# Patient Record
Sex: MALE | Race: WHITE | NOT HISPANIC OR LATINO | Employment: FULL TIME | URBAN - METROPOLITAN AREA
[De-identification: names, ages, dates, MRNs, and addresses within clinical notes are randomized per-mention and may not be internally consistent; named-entity substitution may affect disease eponyms.]

---

## 2018-07-24 ENCOUNTER — OFFICE VISIT (OUTPATIENT)
Dept: INTERNAL MEDICINE | Facility: CLINIC | Age: 53
End: 2018-07-24

## 2018-07-24 VITALS
SYSTOLIC BLOOD PRESSURE: 128 MMHG | WEIGHT: 228 LBS | BODY MASS INDEX: 28.35 KG/M2 | TEMPERATURE: 98.3 F | RESPIRATION RATE: 16 BRPM | HEIGHT: 75 IN | OXYGEN SATURATION: 98 % | DIASTOLIC BLOOD PRESSURE: 78 MMHG | HEART RATE: 73 BPM

## 2018-07-24 DIAGNOSIS — L03.314 CELLULITIS OF RIGHT GROIN: ICD-10-CM

## 2018-07-24 DIAGNOSIS — D53.9 MACROCYTIC ANEMIA: ICD-10-CM

## 2018-07-24 DIAGNOSIS — Z12.5 SCREENING FOR PROSTATE CANCER: ICD-10-CM

## 2018-07-24 DIAGNOSIS — Z12.11 SCREEN FOR COLON CANCER: ICD-10-CM

## 2018-07-24 DIAGNOSIS — S76.211A GROIN STRAIN, RIGHT, INITIAL ENCOUNTER: Primary | ICD-10-CM

## 2018-07-24 PROBLEM — S76.219A GROIN STRAIN: Status: ACTIVE | Noted: 2018-07-24

## 2018-07-24 LAB
ALBUMIN SERPL-MCNC: 4.6 G/DL (ref 3.5–5.2)
ALBUMIN/GLOB SERPL: 2.1 G/DL
ALP SERPL-CCNC: 78 U/L (ref 40–129)
ALT SERPL-CCNC: 22 U/L (ref 5–41)
AST SERPL-CCNC: 30 U/L (ref 5–40)
BASOPHILS # BLD AUTO: 0.02 10*3/MM3 (ref 0–0.2)
BASOPHILS NFR BLD AUTO: 0.2 % (ref 0–2)
BILIRUB SERPL-MCNC: 0.6 MG/DL (ref 0.2–1.2)
BUN SERPL-MCNC: 18 MG/DL (ref 6–20)
BUN/CREAT SERPL: 18.8 (ref 7–25)
CALCIUM SERPL-MCNC: 9.7 MG/DL (ref 8.6–10.5)
CHLORIDE SERPL-SCNC: 101 MMOL/L (ref 98–107)
CO2 SERPL-SCNC: 28 MMOL/L (ref 22–29)
CREAT SERPL-MCNC: 0.96 MG/DL (ref 0.76–1.27)
EOSINOPHIL # BLD AUTO: 0.1 10*3/MM3 (ref 0.1–0.3)
EOSINOPHIL NFR BLD AUTO: 1.2 % (ref 0–4)
ERYTHROCYTE [DISTWIDTH] IN BLOOD BY AUTOMATED COUNT: 12.8 % (ref 11.5–14.5)
GLOBULIN SER CALC-MCNC: 2.2 GM/DL
GLUCOSE SERPL-MCNC: 95 MG/DL (ref 65–99)
HCT VFR BLD AUTO: 40.3 % (ref 42–52)
HGB BLD-MCNC: 13.8 G/DL (ref 14–18)
IMM GRANULOCYTES # BLD: 0.01 10*3/MM3 (ref 0–0.03)
IMM GRANULOCYTES NFR BLD: 0.1 % (ref 0–0.5)
LYMPHOCYTES # BLD AUTO: 1.15 10*3/MM3 (ref 0.6–4.8)
LYMPHOCYTES NFR BLD AUTO: 14.3 % (ref 20–45)
MCH RBC QN AUTO: 32.6 PG (ref 27–31)
MCHC RBC AUTO-ENTMCNC: 34.2 G/DL (ref 31–37)
MCV RBC AUTO: 95.3 FL (ref 80–94)
MONOCYTES # BLD AUTO: 0.77 10*3/MM3 (ref 0–1)
MONOCYTES NFR BLD AUTO: 9.6 % (ref 3–8)
NEUTROPHILS # BLD AUTO: 5.98 10*3/MM3 (ref 1.5–8.3)
NEUTROPHILS NFR BLD AUTO: 74.6 % (ref 45–70)
NRBC BLD AUTO-RTO: 0 /100 WBC (ref 0–0)
PLATELET # BLD AUTO: 337 10*3/MM3 (ref 140–500)
POTASSIUM SERPL-SCNC: 4.6 MMOL/L (ref 3.5–5.2)
PROT SERPL-MCNC: 6.8 G/DL (ref 6–8.5)
PSA SERPL-MCNC: 0.21 NG/ML (ref 0–4)
RBC # BLD AUTO: 4.23 10*6/MM3 (ref 4.7–6.1)
SODIUM SERPL-SCNC: 141 MMOL/L (ref 136–145)
VIT B12 SERPL-MCNC: 387 PG/ML
WBC # BLD AUTO: 8.03 10*3/MM3 (ref 4.8–10.8)

## 2018-07-24 PROCEDURE — 99203 OFFICE O/P NEW LOW 30 MIN: CPT | Performed by: NURSE PRACTITIONER

## 2018-07-24 RX ORDER — SULFAMETHOXAZOLE AND TRIMETHOPRIM 800; 160 MG/1; MG/1
1 TABLET ORAL 2 TIMES DAILY
Qty: 14 TABLET | Refills: 0 | Status: SHIPPED | OUTPATIENT
Start: 2018-07-24 | End: 2018-10-12

## 2018-07-24 NOTE — PATIENT INSTRUCTIONS
Adductor Muscle Strain  An adductor muscle strain, also called a groin strain or pull, is an injury to the muscles or tendon on the upper inner part of the thigh. These muscles are called the adductor muscles or groin muscles. They are responsible for moving the leg across the body or pulling the legs together. A muscle strain occurs when a muscle is overstretched and some muscle fibers are torn. An adductor muscle strain can range from mild to severe, depending on how many muscle fibers are affected and whether the muscle fibers are partially or completely torn.  Adductor muscle strains usually occur during exercise or participation in sports. The injury often happens when a sudden, violent force is placed on a muscle, stretching the muscle too far. A strain is more likely to occur when your muscles are not warmed up or if you are not properly conditioned. Depending on the severity of the muscle strain, recovery time may vary from a few weeks to several weeks. Severe injuries often require 4-6 weeks for recovery. In those cases, complete healing can take 4-5 months.  What are the causes?  This injury may be caused by:  · Stretching the adductor muscles too far or too suddenly, often during side-to-side motion with an abrupt change in direction.  · Putting repeated stress on the adductor muscles over a long period of time.  · Performing vigorous activity without properly stretching the adductor muscles beforehand.    What are the signs or symptoms?  Symptoms of this condition include:  · Pain and tenderness in the groin area. This begins as sharp pain and persists as a dull ache.  · A popping or snapping feeling when the injury occurs (for severe strains).  · Swelling or bruising.  · Muscle spasms.  · Weakness in the leg.  · Stiffness in the groin area with decreased ability to move the affected muscles.    How is this diagnosed?  This condition may be diagnosed based on your symptoms, your description of how the  injury occurred, and a physical exam. X-rays are sometimes needed to rule out a broken bone or cartilage problems. A CT scan or MRI may be done if your health care provider suspects a complete muscle tear or needs to check for other injuries.  How is this treated?  An adductor strain will often heal on its own. Typically, treatment will involve protecting the injured area, rest, ice, pressure (compression), and elevation. This is often called PRICE therapy. Your health care provider may also prescribe medicines to help manage pain and swelling (anti-inflammatory medicine). You may be told to use crutches for the first few days to minimize your pain.  Follow these instructions at home:  PRICE Therapy  · Protect the muscle from being injured again.  · Rest. Do not use the strained muscle if it causes pain.  · If directed, apply ice to the injured area:  ? Put ice in a plastic bag.  ? Place a towel between your skin and the bag.  ? Leave the ice on for 20 minutes, 2-3 times a day. Do this for the first 2 days after the injury.  · Apply compression by wrapping the injured area with an elastic bandage as told by your health care provider.  · Raise (elevate) the injured area above the level of your heart while you are sitting or lying down.  General instructions  · Take over-the-counter and prescription medicines only as told by your health care provider.  · Walk, stretch, and do exercises as told by your health care provider. Only do these activities if you can do so without any pain.  How is this prevented?  · Warm up and stretch before being active.  · Cool down and stretch after being active.  · Give your body time to rest between periods of activity.  · Make sure to use equipment that fits you.  · Be safe and responsible while being active to avoid slips and falls.  · Maintain physical fitness, including:  ? Proper conditioning in the adductor muscles.  ? Overall strength, flexibility, and endurance.  Contact a  health care provider if:  · You have increased pain or swelling in the affected area.  · Your symptoms are not improving or they are getting worse.  This information is not intended to replace advice given to you by your health care provider. Make sure you discuss any questions you have with your health care provider.  Document Released: 08/15/2005 Document Revised: 07/07/2017 Document Reviewed: 09/20/2016  Elsevier Interactive Patient Education © 2018 [employer]

## 2018-07-24 NOTE — PROGRESS NOTES
"Chief Complaint   Patient presents with   • Groin Pain     Rt side; X2Wk       Subjective     Rell Wilson is a 52 y.o. male being seen for a follow up appointment today regarding right groin pain. He was lunging at [place], and felt pain. Associated swelling, redness,  and 'knotty\" area on right thigh. Pain  5-6 of 10. Pain increasing with walking, golfing. He did not try anything for it or apply ice.    He has history of Macrocytic anemia, and has not had any labs done recently    He will also need colon cancer screening done and prostate screening done.      History of Present Illness     Allergies   Allergen Reactions   • Cefadroxil    • Cephalexin    • Dust Mite Extract    • Other    • Penicillins    • Pollen Extract    • Trichophyton          Current Outpatient Prescriptions:   •  albuterol (PROVENTIL HFA;VENTOLIN HFA) 108 (90 BASE) MCG/ACT inhaler, Proventil  (90 Base) MCG/ACT Inhalation Aerosol Solution; Patient Sig: Proventil  (90 Base) MCG/ACT Inhalation Aerosol Solution INHALE 1 TO 2 PUFFS EVERY 4 TO 6 HOURS AS NEEDED.; 1; 3; 16-Oct-2012; Active, Disp: , Rfl:   •  omeprazole OTC (PriLOSEC OTC) 20 MG EC tablet, Take  by mouth., Disp: , Rfl:     The following portions of the patient's history were reviewed and updated as appropriate: allergies, current medications, past family history, past medical history, past social history, past surgical history and problem list.    Review of Systems   Constitutional: Negative.    HENT: Negative.    Eyes: Negative.    Respiratory: Negative.    Cardiovascular: Negative.    Gastrointestinal: Negative.    Endocrine: Negative.    Genitourinary: Negative.    Musculoskeletal: Positive for arthralgias.   Skin: Positive for color change.   Neurological: Negative.    Hematological: Negative.    Psychiatric/Behavioral: Negative.        Assessment     Physical Exam   Constitutional: He is oriented to person, place, and time. He appears well-developed and " well-nourished.   Cardiovascular: Normal rate, regular rhythm and normal heart sounds.    No murmur heard.  Pulmonary/Chest: Effort normal and breath sounds normal. No respiratory distress. He has no wheezes.   Abdominal: Soft. Bowel sounds are normal. He exhibits no distension. There is no tenderness. Hernia confirmed negative in the right inguinal area and confirmed negative in the left inguinal area.   Genitourinary: Testes normal and penis normal. Right testis shows no mass, no swelling and no tenderness. Left testis shows no mass, no swelling and no tenderness.   Genitourinary Comments: Right thigh warm with 12 cm erythematous area to  to abductor muscle. Tender to touch.    Musculoskeletal: He exhibits no edema.   Lymphadenopathy: No inguinal adenopathy noted on the right or left side.   Neurological: He is alert and oriented to person, place, and time.   Skin: There is erythema.   Psychiatric: He has a normal mood and affect. His behavior is normal.   Vitals reviewed.      Plan      Diagnosis Plan   1. Groin strain, right, initial encounter     2. Cellulitis of right groin  sulfamethoxazole-trimethoprim (BACTRIM DS,SEPTRA DS) 800-160 MG per tablet   3. Macrocytic anemia  CBC & Differential    Comprehensive Metabolic Panel    Vitamin B12   4. Screening for prostate cancer  PSA SCREENING   5. Screen for colon cancer  Ambulatory Referral to Gastroenterology     Rest right groin 2-4 weeks.    Apply heat for 10 minutes twice daily    Follow up after labs complete.

## 2018-07-25 DIAGNOSIS — M79.651 RIGHT THIGH PAIN: Primary | ICD-10-CM

## 2018-09-20 ENCOUNTER — TELEPHONE (OUTPATIENT)
Dept: SURGERY | Facility: CLINIC | Age: 53
End: 2018-09-20

## 2018-09-20 DIAGNOSIS — Z12.11 COLON CANCER SCREENING: Primary | ICD-10-CM

## 2018-09-21 PROBLEM — Z12.11 COLON CANCER SCREENING: Status: ACTIVE | Noted: 2018-09-21

## 2018-09-21 RX ORDER — SODIUM PICOSULFATE, MAGNESIUM OXIDE, AND ANHYDROUS CITRIC ACID 10; 3.5; 12 MG/160ML; G/160ML; G/160ML
1 LIQUID ORAL 2 TIMES DAILY
Qty: 320 ML | Refills: 0 | Status: ON HOLD | OUTPATIENT
Start: 2018-09-21 | End: 2018-10-15

## 2018-09-24 ENCOUNTER — PREP FOR SURGERY (OUTPATIENT)
Dept: OTHER | Facility: HOSPITAL | Age: 53
End: 2018-09-24

## 2018-10-12 ENCOUNTER — ANESTHESIA EVENT (OUTPATIENT)
Dept: PERIOP | Facility: HOSPITAL | Age: 53
End: 2018-10-12

## 2018-10-15 ENCOUNTER — HOSPITAL ENCOUNTER (OUTPATIENT)
Facility: HOSPITAL | Age: 53
Setting detail: HOSPITAL OUTPATIENT SURGERY
Discharge: HOME | End: 2018-10-15
Attending: INTERNAL MEDICINE | Admitting: INTERNAL MEDICINE

## 2018-10-15 ENCOUNTER — ANESTHESIA (OUTPATIENT)
Dept: PERIOP | Facility: HOSPITAL | Age: 53
End: 2018-10-15

## 2018-10-15 VITALS
DIASTOLIC BLOOD PRESSURE: 87 MMHG | RESPIRATION RATE: 16 BRPM | SYSTOLIC BLOOD PRESSURE: 119 MMHG | BODY MASS INDEX: 27.83 KG/M2 | HEIGHT: 75 IN | OXYGEN SATURATION: 98 % | WEIGHT: 223.8 LBS | TEMPERATURE: 98.2 F | HEART RATE: 64 BPM

## 2018-10-15 DIAGNOSIS — Z12.11 COLON CANCER SCREENING: ICD-10-CM

## 2018-10-15 PROCEDURE — 45385 COLONOSCOPY W/LESION REMOVAL: CPT | Performed by: INTERNAL MEDICINE

## 2018-10-15 PROCEDURE — 45381 COLONOSCOPY SUBMUCOUS NJX: CPT | Performed by: INTERNAL MEDICINE

## 2018-10-15 PROCEDURE — 25010000002 PROPOFOL 10 MG/ML EMULSION: Performed by: NURSE ANESTHETIST, CERTIFIED REGISTERED

## 2018-10-15 PROCEDURE — 88305 TISSUE EXAM BY PATHOLOGIST: CPT | Performed by: INTERNAL MEDICINE

## 2018-10-15 RX ORDER — SODIUM CHLORIDE, SODIUM LACTATE, POTASSIUM CHLORIDE, CALCIUM CHLORIDE 600; 310; 30; 20 MG/100ML; MG/100ML; MG/100ML; MG/100ML
9 INJECTION, SOLUTION INTRAVENOUS CONTINUOUS PRN
Status: DISCONTINUED | OUTPATIENT
Start: 2018-10-15 | End: 2018-10-15 | Stop reason: HOSPADM

## 2018-10-15 RX ORDER — LIDOCAINE HYDROCHLORIDE 10 MG/ML
0.5 INJECTION, SOLUTION EPIDURAL; INFILTRATION; INTRACAUDAL; PERINEURAL ONCE AS NEEDED
Status: COMPLETED | OUTPATIENT
Start: 2018-10-15 | End: 2018-10-15

## 2018-10-15 RX ORDER — MEPERIDINE HYDROCHLORIDE 25 MG/ML
12.5 INJECTION INTRAMUSCULAR; INTRAVENOUS; SUBCUTANEOUS
Status: CANCELLED | OUTPATIENT
Start: 2018-10-15 | End: 2018-10-16

## 2018-10-15 RX ORDER — SODIUM CHLORIDE 0.9 % (FLUSH) 0.9 %
1-10 SYRINGE (ML) INJECTION AS NEEDED
Status: DISCONTINUED | OUTPATIENT
Start: 2018-10-15 | End: 2018-10-15 | Stop reason: HOSPADM

## 2018-10-15 RX ORDER — SODIUM CHLORIDE 9 MG/ML
40 INJECTION, SOLUTION INTRAVENOUS AS NEEDED
Status: DISCONTINUED | OUTPATIENT
Start: 2018-10-15 | End: 2018-10-15 | Stop reason: HOSPADM

## 2018-10-15 RX ORDER — SODIUM CHLORIDE 0.9 % (FLUSH) 0.9 %
3 SYRINGE (ML) INJECTION EVERY 12 HOURS SCHEDULED
Status: DISCONTINUED | OUTPATIENT
Start: 2018-10-15 | End: 2018-10-15 | Stop reason: HOSPADM

## 2018-10-15 RX ORDER — SODIUM CHLORIDE, SODIUM LACTATE, POTASSIUM CHLORIDE, CALCIUM CHLORIDE 600; 310; 30; 20 MG/100ML; MG/100ML; MG/100ML; MG/100ML
100 INJECTION, SOLUTION INTRAVENOUS CONTINUOUS
Status: CANCELLED | OUTPATIENT
Start: 2018-10-15

## 2018-10-15 RX ORDER — ONDANSETRON 2 MG/ML
4 INJECTION INTRAMUSCULAR; INTRAVENOUS ONCE AS NEEDED
Status: CANCELLED | OUTPATIENT
Start: 2018-10-15

## 2018-10-15 RX ORDER — LIDOCAINE HYDROCHLORIDE 20 MG/ML
INJECTION, SOLUTION INFILTRATION; PERINEURAL AS NEEDED
Status: DISCONTINUED | OUTPATIENT
Start: 2018-10-15 | End: 2018-10-15 | Stop reason: SURG

## 2018-10-15 RX ORDER — PROPOFOL 10 MG/ML
VIAL (ML) INTRAVENOUS AS NEEDED
Status: DISCONTINUED | OUTPATIENT
Start: 2018-10-15 | End: 2018-10-15 | Stop reason: SURG

## 2018-10-15 RX ADMIN — PROPOFOL 40 MG: 10 INJECTION, EMULSION INTRAVENOUS at 11:58

## 2018-10-15 RX ADMIN — SODIUM CHLORIDE, POTASSIUM CHLORIDE, SODIUM LACTATE AND CALCIUM CHLORIDE: 600; 310; 30; 20 INJECTION, SOLUTION INTRAVENOUS at 11:06

## 2018-10-15 RX ADMIN — PROPOFOL 50 MG: 10 INJECTION, EMULSION INTRAVENOUS at 11:55

## 2018-10-15 RX ADMIN — PROPOFOL 40 MG: 10 INJECTION, EMULSION INTRAVENOUS at 12:30

## 2018-10-15 RX ADMIN — SODIUM CHLORIDE, POTASSIUM CHLORIDE, SODIUM LACTATE AND CALCIUM CHLORIDE 9 ML/HR: 600; 310; 30; 20 INJECTION, SOLUTION INTRAVENOUS at 10:15

## 2018-10-15 RX ADMIN — PROPOFOL 40 MG: 10 INJECTION, EMULSION INTRAVENOUS at 12:09

## 2018-10-15 RX ADMIN — PROPOFOL 40 MG: 10 INJECTION, EMULSION INTRAVENOUS at 12:07

## 2018-10-15 RX ADMIN — PROPOFOL 50 MG: 10 INJECTION, EMULSION INTRAVENOUS at 11:49

## 2018-10-15 RX ADMIN — LIDOCAINE HYDROCHLORIDE 100 MG: 20 INJECTION, SOLUTION INFILTRATION; PERINEURAL at 11:37

## 2018-10-15 RX ADMIN — PROPOFOL 50 MG: 10 INJECTION, EMULSION INTRAVENOUS at 12:26

## 2018-10-15 RX ADMIN — PROPOFOL 40 MG: 10 INJECTION, EMULSION INTRAVENOUS at 12:11

## 2018-10-15 RX ADMIN — PROPOFOL 40 MG: 10 INJECTION, EMULSION INTRAVENOUS at 12:13

## 2018-10-15 RX ADMIN — PROPOFOL 40 MG: 10 INJECTION, EMULSION INTRAVENOUS at 12:17

## 2018-10-15 RX ADMIN — PROPOFOL 50 MG: 10 INJECTION, EMULSION INTRAVENOUS at 11:43

## 2018-10-15 RX ADMIN — PROPOFOL 50 MG: 10 INJECTION, EMULSION INTRAVENOUS at 11:38

## 2018-10-15 RX ADMIN — LIDOCAINE HYDROCHLORIDE 0.2 ML: 10 INJECTION, SOLUTION EPIDURAL; INFILTRATION; INTRACAUDAL; PERINEURAL at 10:15

## 2018-10-15 RX ADMIN — PROPOFOL 40 MG: 10 INJECTION, EMULSION INTRAVENOUS at 12:04

## 2018-10-15 RX ADMIN — PROPOFOL 50 MG: 10 INJECTION, EMULSION INTRAVENOUS at 11:40

## 2018-10-15 RX ADMIN — PROPOFOL 40 MG: 10 INJECTION, EMULSION INTRAVENOUS at 12:15

## 2018-10-15 RX ADMIN — PROPOFOL 40 MG: 10 INJECTION, EMULSION INTRAVENOUS at 12:01

## 2018-10-15 RX ADMIN — PROPOFOL 40 MG: 10 INJECTION, EMULSION INTRAVENOUS at 12:22

## 2018-10-15 RX ADMIN — PROPOFOL 50 MG: 10 INJECTION, EMULSION INTRAVENOUS at 11:46

## 2018-10-15 RX ADMIN — PROPOFOL 50 MG: 10 INJECTION, EMULSION INTRAVENOUS at 11:52

## 2018-10-15 RX ADMIN — PROPOFOL 50 MG: 10 INJECTION, EMULSION INTRAVENOUS at 11:37

## 2018-10-15 RX ADMIN — PROPOFOL 40 MG: 10 INJECTION, EMULSION INTRAVENOUS at 12:19

## 2018-10-15 NOTE — ANESTHESIA PREPROCEDURE EVALUATION
Anesthesia Evaluation     Patient summary reviewed and Nursing notes reviewed   no history of anesthetic complications:  NPO Solid Status: > 8 hours  NPO Liquid Status: > 4 hours           Airway   Mallampati: I  TM distance: >3 FB  Neck ROM: full  No difficulty expected  Dental          Pulmonary - normal exam   (+) a smoker (rare cigar), asthma (last used inhaler 3 weeks ago),   Cardiovascular - negative cardio ROS and normal exam    Rhythm: regular  Rate: normal        Neuro/Psych  GI/Hepatic/Renal/Endo    (+)  GERD well controlled,      Musculoskeletal     (+) back pain,   Abdominal  - normal exam   Substance History   (+) alcohol use (occ),      OB/GYN          Other - negative ROS                       Anesthesia Plan    ASA 2     MAC     intravenous induction   Anesthetic plan, all risks, benefits, and alternatives have been provided, discussed and informed consent has been obtained with: patient.  Use of blood products discussed with patient  Consented to blood products.

## 2018-10-15 NOTE — H&P
[facility], [place], operated by [facility]  Pre-procedure H&P    Chief Complaint: Screening colonoscopy    Rell Wilson is a 52 y.o. male  who is referred by Miranda Caputo MD for a colonoscopy. He is an Asymptomatic person Age 50 years and older who has a history of screening for colon cancer.   He has had no previous colonoscopies.    He denies any change in bowel function, melena, hematochezia or unexplained weight loss.    Past Medical History:   Diagnosis Date   • Asthma    • GERD (gastroesophageal reflux disease)    • Plantar fasciitis        Past Surgical History:   Procedure Laterality Date   • ENDOSCOPY     • KNEE SURGERY     • TONSILLECTOMY         Family History   Problem Relation Age of Onset   • COPD Mother    • Heart disease Father    • Heart disease Sister    • Diabetes Maternal Grandfather        Social History     Social History   • Marital status:      Spouse name: N/A   • Number of children: N/A   • Years of education: N/A     Occupational History   • Not on file.     Social History Main Topics   • Smoking status: Current Some Day Smoker     Types: Cigars   • Smokeless tobacco: Never Used   • Alcohol use 1.2 oz/week     2 Cans of beer per week      Comment: weekends   • Drug use: No   • Sexual activity: Defer     Other Topics Concern   • Not on file     Social History Narrative   • No narrative on file         Current Facility-Administered Medications:   •  lactated ringers infusion, 9 mL/hr, Intravenous, Continuous PRN, Nate Mckeon CRNA, Last Rate: 9 mL/hr at 10/15/18 1015, 9 mL/hr at 10/15/18 1015  •  sodium chloride 0.9 % flush 1-10 mL, 1-10 mL, Intravenous, PRN, Nate Mckeon CRNA  •  sodium chloride 0.9 % flush 3 mL, 3 mL, Intravenous, Q12H, Nate Mckeon CRNA  •  sodium chloride 0.9 % infusion 40 mL, 40 mL, Intravenous, PRN, Nate Mckeon CRNA  •  sterile water 1,000 mL with simethicone 40 MG/0.6ML 3 mL mixture, , , PRN, Miranda Caputo MD, 300 mL at 10/15/18  1129  No current facility-administered medications on file prior to encounter.      Current Outpatient Prescriptions on File Prior to Encounter   Medication Sig Dispense Refill   • albuterol (PROVENTIL HFA;VENTOLIN HFA) 108 (90 BASE) MCG/ACT inhaler Proventil  (90 Base) MCG/ACT Inhalation Aerosol Solution; Patient Sig: Proventil  (90 Base) MCG/ACT Inhalation Aerosol Solution INHALE 1 TO 2 PUFFS EVERY 4 TO 6 HOURS AS NEEDED.; 1; 3; 16-Oct-2012; Active     • omeprazole OTC (PriLOSEC OTC) 20 MG EC tablet Take  by mouth.     • [DISCONTINUED] CLENPIQ 10-3.5-12 MG-GM -GM/160ML solution Take 1 bottle by mouth 2 (Two) Times a Day. 320 mL 0       Allergies   Allergen Reactions   • Cefadroxil    • Cephalexin    • Dust Mite Extract    • Other    • Penicillins    • Pollen Extract    • Trichophyton             Vitals:    10/15/18 0956   BP: 138/87   Pulse: 80   Resp: 16   Temp: 98.2 °F (36.8 °C)   SpO2: 98%       Physical Exam:   General: patient awake, alert and cooperative   Eyes: Normal lids and lashes, no scleral icterus   Neck: supple, normal ROM   Skin: warm and dry, not jaundiced   Cardiovascular: regular rhythm and rate, no murmurs auscultated   Pulm: clear to auscultation bilaterally, regular and unlabored   Abdomen: soft, nontender, nondistended; normal bowel sounds   Extremities: no rash or edema   Psychiatric: Normal mood and behavior    Debilities: None         Assessment/Plan       No diagnosis found.    Schedule for colonoscopy.      Indications, risks and procedure were discussed with the patient, including but not limited to, bleeding, infection, possibility of perforation and possible polypectomy. All of the patient's questions were answered, and signed informed consent was obtained and placed on the chart.         Miranda Caputo MD  [facility], [facility]  [unfilled]

## 2018-10-15 NOTE — OP NOTE
Patient Name:  Rell Wilson  YOB: 1965    Date of Procedure:  10/15/2018    Procedure Performed:  colonoscopy     Indications: screening    Pre-op Diagnosis:   Colon cancer screening [Z12.11]    Post-Op Diagnosis Codes:     * Colon cancer screening [Z12.11]         Staff:  Surgeon(s):  Miranda Caputo MD         Consent: Procedure Colonoscopy Indications, risks and procedure were discussed with the patient, including but not limited to, bleeding, infection, possibility of perforation and possible polypectomy. All of the patient's questions were answered, and signed informed consent was obtained and placed on the chart.      Sedation: Sedation was provided by anesthesia.      Estimated Blood Loss: minimal    Specimens:   ID Type Source Tests Collected by Time   A : Ascending colon polyp x1 Polyp Large Intestine, Right / Ascending Colon TISSUE PATHOLOGY EXAM Miranda Caputo MD 10/15/2018 1155   B : TRANSVERSE COLON POLYP Polyp Large Intestine, Transverse Colon TISSUE PATHOLOGY EXAM Miranda Caputo MD 10/15/2018 1219         Description of Procedure: After excellent sedation a digital rectal examination is performed and a flexible colonoscope was inserted into the rectum passed to the cecum.  The cecum was identified by both the ileocecal valve and the appendiceal orifice.  The overall bowel preparation was fair.  Upon withdrawal scope careful examination mucosa was made.  Pertinent findings include a 2-1/2 centimeters sessile ascending polyp that was seen and removed using snare cautery but in a piecemeal fashion 3 rounds of cautery were applied.  Some of the edges were cauterized with the tip of the snare.  This area was injected with about 3 cc of Lary ink.  In the transverse colon at the had a 2 cm partially pedunculated polyp that was seen and removed completely using snare cautery.  This polyp was too large to suck up in one piece such that it was cut 3 times.  There is mild scattered  diverticulosis noted throughout the colon.  The scope was then slowly withdrawn to the rectum and retroflexed were internal hemorrhoids are noted.  Scope was then straightened and withdrawn out of the patient with no immediate, patient's and he tolerated the procedure well.      Findings: Colon polyps removed using snare cautery, Lary ink applied to one of the polypectomy sites.  We will likely need a repeat colonoscopy in 3-6 months to take a second look at the polypectomy site in the ascending colon.    Complications: No immediate complications        Miranda Caputo MD     Date: 10/15/2018  Time: 12:37 PM

## 2018-10-16 ENCOUNTER — TELEPHONE (OUTPATIENT)
Dept: SURGERY | Facility: CLINIC | Age: 53
End: 2018-10-16

## 2018-10-17 LAB
CYTO UR: NORMAL
LAB AP CASE REPORT: NORMAL
LAB AP CLINICAL INFORMATION: NORMAL
PATH REPORT.FINAL DX SPEC: NORMAL
PATH REPORT.GROSS SPEC: NORMAL

## 2018-11-08 ENCOUNTER — TELEPHONE (OUTPATIENT)
Dept: GASTROENTEROLOGY | Facility: CLINIC | Age: 53
End: 2018-11-08

## 2018-11-08 DIAGNOSIS — D12.2 ADENOMATOUS POLYP OF ASCENDING COLON: Primary | ICD-10-CM

## 2018-11-08 RX ORDER — SODIUM PICOSULFATE, MAGNESIUM OXIDE, AND ANHYDROUS CITRIC ACID 10; 3.5; 12 MG/160ML; G/160ML; G/160ML
1 LIQUID ORAL 2 TIMES DAILY
Qty: 320 ML | Refills: 0 | Status: SHIPPED | OUTPATIENT
Start: 2018-11-08 | End: 2019-01-07 | Stop reason: SDUPTHER

## 2018-11-08 NOTE — TELEPHONE ENCOUNTER
Dr. Miranda Caputo   Date: ____1/14/19________     Arrival Time: __7:30am_______    Hospital:  [facility] ([address] [place] [address] [place] [address]) (back of hospital at the emergency room) or         Please call the office as soon as possible if you need to reschedule or cancel your procedure please give two weeks’ notice.  If you do not show up or frequently reschedule your procedure, your provider has the option of not rescheduling.    Stop the following medications 5 days prior to your procedure- check with the prescribing doctor prior to holding.  No Aspirin, Advil, Aleve, Motrin, IBU or anything listed on the back of this form.    If you are taking any medications on the attached list and/or pills that contain iron please stop them one week prior. Insulin will be addressed separately.    1.  your prescription AND a 10 oz. bottle of Magnesium Citrate (over the counter) as soon as possible. Do not wait until the day before in case of issues with cost or etc.    The day before your procedure  It is very important that you follow the instructions on this form and not on the prep you were prescribed.    You will be on a clear liquid diet only which may include coffee , tea, soft drinks, broth(chicken beef or vegetable), popsicles, Jell-O, Gatorade, juice (no orange, grapefruit or V-8).  ®No red or purple dyes and no dairy or non-dairy.    2. At 8:00 am drink the bottle of magnesium citrate.  Drink plenty of fluids in between    3. At    2:00 pm drink first dose or ½ of prep, mix as directed on container/box    Drink plenty of fluids between    4. At   10:00 pm      drink second dose or ½ of prep, mix as directed on container/box    5. If you start to get nauseous while drinking your prep try stepping away for 15-20 min. then resume again at a slower pace.    You may have clear liquids only up to 4 hours before your procedure.  No gum or candy!     You may only take your morning prescription blood  pressure (no diuretic combinations), breathing, seizure, psych or heart medications.     You will need a  to accompany you for your exam. Bus or uber not allowedThe average time spent in our facility is around 2-3 hours.  You are not to drive, operate machinery or make legal decisions the remainder of the day following you procedure.    Please call [email or web address] [phone] if you have questions about any of this information or your bowel movements are still soild the morning of your procedure.  If it is after hours or the weekend and you need to reach the doctor or have questions for the office please call [phone].     It is your responsibility to check with your insurance company to determine benefits and out of pocket costs.      Avoid these medications 5-7 days prior to surgery  Please check with your prescribing doctor before stopping any medications  NSAIDS- Advil, Aleve, Motrin, Ibuprofen, Midol, Excedrin, Fiorinal, Lizette-Randolph  (Some cold medications may have these in them)    All herbal medications- iron, vitamin E, fish oil, decongestants (phenylephrine, pseudoephedrine), ginkgo, garlic, ginseng, Haider’s wart    Mobic (meloxicam), Celebrex, Diclofenac (Voltaren), Nambumetone (Relafen), Daypro, Naproxen, Sulindac, Indomethacin, Toradol, Feldine, Salsalate, Etodolac (Lodine),     Viagra, Cialis, Levitra    Aspirin, Plavix (clopidogrel), Effient, Pletal, Coumadin, Pradaxa, Brilinta, Ticlide, Eliquis, (Xaralto- 3 days)      Diet pills -Adipex (phentermine) -2 weeks prior

## 2018-11-15 ENCOUNTER — PREP FOR SURGERY (OUTPATIENT)
Dept: OTHER | Facility: HOSPITAL | Age: 53
End: 2018-11-15

## 2019-01-07 RX ORDER — SODIUM PICOSULFATE, MAGNESIUM OXIDE, AND ANHYDROUS CITRIC ACID 10; 3.5; 12 MG/160ML; G/160ML; G/160ML
1 LIQUID ORAL 2 TIMES DAILY
Qty: 320 ML | Refills: 0 | Status: SHIPPED | OUTPATIENT
Start: 2019-01-07 | End: 2019-01-07

## 2019-01-11 ENCOUNTER — ANESTHESIA EVENT (OUTPATIENT)
Dept: PERIOP | Facility: HOSPITAL | Age: 54
End: 2019-01-11

## 2019-01-14 ENCOUNTER — HOSPITAL ENCOUNTER (OUTPATIENT)
Facility: HOSPITAL | Age: 54
Setting detail: HOSPITAL OUTPATIENT SURGERY
Discharge: HOME | End: 2019-01-14
Attending: INTERNAL MEDICINE | Admitting: INTERNAL MEDICINE

## 2019-01-14 ENCOUNTER — ANESTHESIA (OUTPATIENT)
Dept: PERIOP | Facility: HOSPITAL | Age: 54
End: 2019-01-14

## 2019-01-14 VITALS
SYSTOLIC BLOOD PRESSURE: 112 MMHG | WEIGHT: 232 LBS | BODY MASS INDEX: 29 KG/M2 | DIASTOLIC BLOOD PRESSURE: 85 MMHG | HEART RATE: 70 BPM | TEMPERATURE: 98.1 F | OXYGEN SATURATION: 95 %

## 2019-01-14 DIAGNOSIS — D12.2 ADENOMATOUS POLYP OF ASCENDING COLON: ICD-10-CM

## 2019-01-14 PROCEDURE — 45380 COLONOSCOPY AND BIOPSY: CPT | Performed by: INTERNAL MEDICINE

## 2019-01-14 PROCEDURE — 88305 TISSUE EXAM BY PATHOLOGIST: CPT | Performed by: INTERNAL MEDICINE

## 2019-01-14 PROCEDURE — 25010000002 PROPOFOL 10 MG/ML EMULSION: Performed by: NURSE ANESTHETIST, CERTIFIED REGISTERED

## 2019-01-14 RX ORDER — SODIUM CHLORIDE 9 MG/ML
40 INJECTION, SOLUTION INTRAVENOUS AS NEEDED
Status: DISCONTINUED | OUTPATIENT
Start: 2019-01-14 | End: 2019-01-14 | Stop reason: HOSPADM

## 2019-01-14 RX ORDER — SODIUM CHLORIDE, SODIUM LACTATE, POTASSIUM CHLORIDE, CALCIUM CHLORIDE 600; 310; 30; 20 MG/100ML; MG/100ML; MG/100ML; MG/100ML
9 INJECTION, SOLUTION INTRAVENOUS CONTINUOUS
Status: DISCONTINUED | OUTPATIENT
Start: 2019-01-14 | End: 2019-01-14 | Stop reason: HOSPADM

## 2019-01-14 RX ORDER — SODIUM CHLORIDE 0.9 % (FLUSH) 0.9 %
1-10 SYRINGE (ML) INJECTION AS NEEDED
Status: DISCONTINUED | OUTPATIENT
Start: 2019-01-14 | End: 2019-01-14 | Stop reason: HOSPADM

## 2019-01-14 RX ORDER — MAGNESIUM HYDROXIDE 1200 MG/15ML
LIQUID ORAL AS NEEDED
Status: DISCONTINUED | OUTPATIENT
Start: 2019-01-14 | End: 2019-01-14 | Stop reason: HOSPADM

## 2019-01-14 RX ORDER — PROPOFOL 10 MG/ML
VIAL (ML) INTRAVENOUS AS NEEDED
Status: DISCONTINUED | OUTPATIENT
Start: 2019-01-14 | End: 2019-01-14 | Stop reason: SURG

## 2019-01-14 RX ORDER — PROPOFOL 10 MG/ML
VIAL (ML) INTRAVENOUS CONTINUOUS PRN
Status: DISCONTINUED | OUTPATIENT
Start: 2019-01-14 | End: 2019-01-14 | Stop reason: SURG

## 2019-01-14 RX ORDER — SODIUM CHLORIDE 0.9 % (FLUSH) 0.9 %
3 SYRINGE (ML) INJECTION EVERY 12 HOURS SCHEDULED
Status: DISCONTINUED | OUTPATIENT
Start: 2019-01-14 | End: 2019-01-14 | Stop reason: HOSPADM

## 2019-01-14 RX ORDER — LIDOCAINE HYDROCHLORIDE 10 MG/ML
INJECTION, SOLUTION INFILTRATION; PERINEURAL AS NEEDED
Status: DISCONTINUED | OUTPATIENT
Start: 2019-01-14 | End: 2019-01-14 | Stop reason: SURG

## 2019-01-14 RX ORDER — LIDOCAINE HYDROCHLORIDE 10 MG/ML
0.5 INJECTION, SOLUTION EPIDURAL; INFILTRATION; INTRACAUDAL; PERINEURAL ONCE AS NEEDED
Status: DISCONTINUED | OUTPATIENT
Start: 2019-01-14 | End: 2019-01-14 | Stop reason: HOSPADM

## 2019-01-14 RX ORDER — GLYCOPYRROLATE 0.2 MG/ML
INJECTION INTRAMUSCULAR; INTRAVENOUS AS NEEDED
Status: DISCONTINUED | OUTPATIENT
Start: 2019-01-14 | End: 2019-01-14 | Stop reason: SURG

## 2019-01-14 RX ADMIN — PROPOFOL 50 MG: 10 INJECTION, EMULSION INTRAVENOUS at 09:05

## 2019-01-14 RX ADMIN — PROPOFOL 50 MG: 10 INJECTION, EMULSION INTRAVENOUS at 09:17

## 2019-01-14 RX ADMIN — GLYCOPYRROLATE 0.1 MG: 0.2 INJECTION INTRAMUSCULAR; INTRAVENOUS at 08:48

## 2019-01-14 RX ADMIN — PROPOFOL 50 MG: 10 INJECTION, EMULSION INTRAVENOUS at 08:49

## 2019-01-14 RX ADMIN — PROPOFOL 100 MCG/KG/MIN: 10 INJECTION, EMULSION INTRAVENOUS at 08:49

## 2019-01-14 RX ADMIN — PROPOFOL 50 MG: 10 INJECTION, EMULSION INTRAVENOUS at 09:11

## 2019-01-14 RX ADMIN — PROPOFOL 50 MG: 10 INJECTION, EMULSION INTRAVENOUS at 08:55

## 2019-01-14 RX ADMIN — PROPOFOL 40 MG: 10 INJECTION, EMULSION INTRAVENOUS at 08:51

## 2019-01-14 RX ADMIN — SODIUM CHLORIDE, POTASSIUM CHLORIDE, SODIUM LACTATE AND CALCIUM CHLORIDE: 600; 310; 30; 20 INJECTION, SOLUTION INTRAVENOUS at 08:45

## 2019-01-14 RX ADMIN — LIDOCAINE HYDROCHLORIDE 50 MG: 10 INJECTION, SOLUTION INFILTRATION; PERINEURAL at 08:48

## 2019-01-14 RX ADMIN — PROPOFOL 50 MG: 10 INJECTION, EMULSION INTRAVENOUS at 09:00

## 2019-01-14 RX ADMIN — SODIUM CHLORIDE, POTASSIUM CHLORIDE, SODIUM LACTATE AND CALCIUM CHLORIDE 9 ML/HR: 600; 310; 30; 20 INJECTION, SOLUTION INTRAVENOUS at 08:27

## 2019-01-14 NOTE — ANESTHESIA PREPROCEDURE EVALUATION
Anesthesia Evaluation     Patient summary reviewed and Nursing notes reviewed   no history of anesthetic complications:  NPO Solid Status: > 8 hours  NPO Liquid Status: > 4 hours           Airway   Mallampati: I  TM distance: >3 FB  Neck ROM: full  No difficulty expected  Dental - normal exam         Pulmonary - normal exam    breath sounds clear to auscultation  (+) asthma (last used inhaler 3 weeks ago),   Smoker: rare cigar.  Cardiovascular - negative cardio ROS and normal exam    Rhythm: regular  Rate: normal        Neuro/Psych- negative ROS  GI/Hepatic/Renal/Endo    (+)  GERD well controlled,      Musculoskeletal     (+) back pain,   Abdominal  - normal exam   Substance History   (+) alcohol use (occ),      OB/GYN          Other - negative ROS                         Anesthesia Plan    ASA 2     MAC     intravenous induction   Anesthetic plan, all risks, benefits, and alternatives have been provided, discussed and informed consent has been obtained with: patient.  Use of blood products discussed with patient  Consented to blood products.

## 2019-01-14 NOTE — H&P
[facility]  Pre-procedure H&P    Chief Complaint:history of colon polyps    Rell Wilson is a 53 y.o. male  who is referred by Miranda Caputo MD for a colonoscopy. He is an Asymptomatic person at high risk:  Personal history of adenomatous polyps who has a history of previous adenomatous polyp.   He has had 4 months  Ago. He had 2 large polyps removed. Ascending polyp removed in piecemeal fashion and area tattooed.    He denies any change in bowel function, melena, hematochezia or unexplained weight loss.    Twelve point ROS performed and all are negative    Past Medical History:   Diagnosis Date   • Asthma    • Colon polyp    • GERD (gastroesophageal reflux disease)    • Plantar fasciitis        Past Surgical History:   Procedure Laterality Date   • COLON SURGERY      colonoscopy   • COLONOSCOPY N/A 10/15/2018    Procedure: COLONOSCOPY with polypectomy and tattooing;  Surgeon: Miranda Caputo MD;  Location: [facility];  Service: Gastroenterology   • ENDOSCOPY      x 2   • KNEE SURGERY     • TONSILLECTOMY         Family History   Problem Relation Age of Onset   • COPD Mother    • Heart disease Father    • Heart disease Sister    • Diabetes Maternal Grandfather    • Colon cancer Neg Hx    • Colon polyps Neg Hx        Social History     Socioeconomic History   • Marital status:      Spouse name: Not on file   • Number of children: Not on file   • Years of education: Not on file   • Highest education level: Not on file   Social Needs   • Financial resource strain: Not on file   • Food insecurity - worry: Not on file   • Food insecurity - inability: Not on file   • Transportation needs - medical: Not on file   • Transportation needs - non-medical: Not on file   Occupational History   • Not on file   Tobacco Use   • Smoking status: Current Some Day Smoker     Types: Cigars   • Smokeless tobacco: Never Used   Substance and Sexual Activity   • Alcohol use: Yes     Alcohol/week: 1.2 oz      Types: 2 Cans of beer per week     Comment: weekends   • Drug use: No   • Sexual activity: Defer   Other Topics Concern   • Not on file   Social History Narrative   • Not on file         Current Facility-Administered Medications:   •  lactated ringers infusion, 9 mL/hr, Intravenous, Continuous, Francis Blackmon CRNA, Last Rate: 9 mL/hr at 01/14/19 0827, 9 mL/hr at 01/14/19 0827  •  lidocaine PF 1% (XYLOCAINE) injection 0.5 mL, 0.5 mL, Injection, Once PRN, Francis Blackmon CRNA  •  sodium chloride 0.9 % flush 1-10 mL, 1-10 mL, Intravenous, PRN, Francis Blackmon CRNA  •  sodium chloride 0.9 % flush 3 mL, 3 mL, Intravenous, Q12H, Francis Blackmon CRNA  •  sodium chloride 0.9 % infusion 40 mL, 40 mL, Intravenous, PRN, Francis Blackmon CRNA  •  sterile water 1,000 mL with simethicone 40 MG/0.6ML 3 mL mixture, , , PRNHarshal Shiela, MD, 500 mL at 01/14/19 0827  •  sterile water irrigation solution, , , Harshal GIL Shiela, MD, 1,000 mL at 01/14/19 0828  No current facility-administered medications on file prior to encounter.      Current Outpatient Medications on File Prior to Encounter   Medication Sig Dispense Refill   • albuterol (PROVENTIL HFA;VENTOLIN HFA) 108 (90 BASE) MCG/ACT inhaler Proventil  (90 Base) MCG/ACT Inhalation Aerosol Solution; Patient Sig: Proventil  (90 Base) MCG/ACT Inhalation Aerosol Solution INHALE 1 TO 2 PUFFS EVERY 4 TO 6 HOURS AS NEEDED.; 1; 3; 16-Oct-2012; Active     • omeprazole OTC (PriLOSEC OTC) 20 MG EC tablet Take  by mouth.         Allergies   Allergen Reactions   • Cefadroxil Nausea And Vomiting   • Cephalexin    • Dust Mite Extract    • Other    • Penicillins    • Pollen Extract    • Trichophyton             Vitals:    01/14/19 0810   BP: 112/85   Pulse: 70   Temp: 98.1 °F (36.7 °C)   SpO2: 95%       Physical Exam:   General: patient awake, alert and cooperative   Eyes: Normal lids and lashes, no scleral icterus   Neck:  supple, normal ROM   Skin: warm and dry, not jaundiced   Cardiovascular: regular rhythm and rate, no murmurs auscultated   Pulm: clear to auscultation bilaterally, regular and unlabored   Abdomen: soft, nontender, nondistended; normal bowel sounds   Extremities: no rash or edema   Psychiatric: Normal mood and behavior    Debilities: None         Assessment/Plan       No diagnosis found.    Schedule for colonoscopy.      Indications, risks and procedure were discussed with the patient, including but not limited to, bleeding, infection, possibility of perforation and possible polypectomy. All of the patient's questions were answered, and signed informed consent was obtained and placed on the chart.         Miranda Caputo MD  [facility]  [unfilled]

## 2019-01-14 NOTE — OP NOTE
Patient Name:  Rell Wilson  YOB: 1965    Date of Procedure:  1/14/2019    Procedure Performed: colonoscopy   Indications:  H.o. Colon polyps    Pre-op Diagnosis:   Adenomatous polyp of ascending colon [D12.2]    Post-Op Diagnosis Codes:     * Adenomatous polyp of ascending colon [D12.2]         Staff:  Surgeon(s):  Miranda Caputo MD         Consent: Procedure ColonoscopyIndications, risks and procedure were discussed with the patient, including but not limited to, bleeding, infection, possibility of perforation and possible polypectomy. All of the patient's questions were answered, and signed informed consent was obtained and placed on the chart.      Sedation: Sedation was provided by anesthesia.      Estimated Blood Loss: minimal    Specimens:   ID Type Source Tests Collected by Time   A : ascending polyp x2 Polyp Large Intestine, Right / Ascending Colon TISSUE PATHOLOGY EXAM Miranda Caputo MD 1/14/2019 0901   B : Cecal Polyp x1 Polyp Large Intestine, Cecum TISSUE PATHOLOGY EXAM Miranda Caputo MD 1/14/2019 0908         Description of Procedure:   After excellent sedation digital rectal examination is performed and a flexible colonoscope was inserted into the rectum passed to the cecum.  The cecum was identified by both the ileocecal valve and the appendiceal orifice.  The overall bowel preparation was good.  Upon withdrawal scope careful examination mucosa was made.  Pertinent findings include a 2-3 mm sessile cecal polyp removed with forceps and a total of 2 ascending polyps are about 3 mm each sessile removed with forceps.  The previous polypectomy site in the ascending colon is noted and appears to be free of any adenomatous tissue.  Upon withdrawal scope careful examination the remainder the mucosa was made.  Scattered diverticular disease are noted in the sigmoid colon.  The scope was slowly withdrawn to the rectum and retroflex were internal hemorrhoids are noted.  Copious  straightened and withdrawn out of the patient with no immediate, patient's and he tolerated procedure well.      Findings: Colon polyps removed using forceps  Diverticulosis  Internal hemorrhoids  He can keep her screening at every 3 years.  Complications: None        Miranda Caputo MD     Date: 1/14/2019  Time: 9:31 AM

## 2019-01-14 NOTE — ANESTHESIA POSTPROCEDURE EVALUATION
Patient: Rell Wilson    Procedure Summary     Date:  01/14/19 Room / Location:  [facility] ENDOSCOPY 2 /  [facility]    Anesthesia Start:  0845 Anesthesia Stop:  0923    Procedure:  COLONOSCOPY, polypectomy  (N/A ) Diagnosis:       Adenomatous polyp of ascending colon      (Adenomatous polyp of ascending colon [D12.2])    Surgeon:  Miranda Caputo MD Provider:  Nate Mckeon CRNA    Anesthesia Type:  MAC ASA Status:  2          Anesthesia Type: MAC  Last vitals  BP   112/85 (01/14/19 0810)   Temp   98.1 °F (36.7 °C) (01/14/19 0810)   Pulse   70 (01/14/19 0810)   Resp         SpO2   95 % (01/14/19 0810)     Post Anesthesia Care and Evaluation    Patient location during evaluation: bedside  Patient participation: complete - patient participated  Level of consciousness: awake and alert  Pain score: 0  Pain management: adequate  Airway patency: patent  Anesthetic complications: No anesthetic complications  PONV Status: none  Cardiovascular status: acceptable  Respiratory status: acceptable  Hydration status: acceptable

## 2019-01-15 LAB
CYTO UR: NORMAL
LAB AP CASE REPORT: NORMAL
PATH REPORT.FINAL DX SPEC: NORMAL
PATH REPORT.GROSS SPEC: NORMAL

## 2019-12-06 ENCOUNTER — TELEPHONE (OUTPATIENT)
Dept: INTERNAL MEDICINE | Facility: CLINIC | Age: 54
End: 2019-12-06

## 2019-12-06 RX ORDER — AZITHROMYCIN 250 MG/1
TABLET, FILM COATED ORAL
Qty: 6 TABLET | Refills: 0 | Status: SHIPPED | OUTPATIENT
Start: 2019-12-06 | End: 2021-07-28

## 2019-12-06 NOTE — TELEPHONE ENCOUNTER
Patient is with his job in near [place] and has symptoms of deep cough from chest, with dark phlegm and sore throat.  He is using his asthma inhaler but asks if PCP can call something in to [facility] on [address].  Please contact patient.

## 2020-01-20 ENCOUNTER — TELEPHONE (OUTPATIENT)
Dept: INTERNAL MEDICINE | Facility: CLINIC | Age: 55
End: 2020-01-20

## 2020-01-21 RX ORDER — ALBUTEROL SULFATE 90 UG/1
2 AEROSOL, METERED RESPIRATORY (INHALATION) EVERY 4 HOURS PRN
Qty: 1 INHALER | Refills: 1 | Status: SHIPPED | OUTPATIENT
Start: 2020-01-21 | End: 2021-07-28

## 2020-08-13 ENCOUNTER — TRANSCRIBE ORDERS (OUTPATIENT)
Dept: ADMINISTRATIVE | Facility: HOSPITAL | Age: 55
End: 2020-08-13

## 2020-08-13 DIAGNOSIS — N63.41 SUBAREOLAR MASS OF RIGHT BREAST: Primary | ICD-10-CM

## 2020-08-27 ENCOUNTER — HOSPITAL ENCOUNTER (OUTPATIENT)
Dept: MAMMOGRAPHY | Facility: CLINIC | Age: 55
Discharge: HOME | End: 2020-08-27
Payer: COMMERCIAL

## 2020-08-27 ENCOUNTER — HOSPITAL ENCOUNTER (OUTPATIENT)
Dept: ULTRASOUND IMAGING | Facility: CLINIC | Age: 55
Discharge: HOME | End: 2020-08-27
Payer: COMMERCIAL

## 2020-08-27 VITALS — TEMPERATURE: 97 F | WEIGHT: 220 LBS | HEIGHT: 72 IN | BODY MASS INDEX: 29.8 KG/M2

## 2020-08-27 DIAGNOSIS — N63.41 SUBAREOLAR LUMP OF RIGHT BREAST: ICD-10-CM

## 2020-08-27 DIAGNOSIS — N63.41 SUBAREOLAR MASS OF RIGHT BREAST: ICD-10-CM

## 2020-08-27 PROCEDURE — G0279 TOMOSYNTHESIS, MAMMO: HCPCS

## 2020-08-27 PROCEDURE — 77066 DX MAMMO INCL CAD BI: CPT

## 2021-07-01 NOTE — ANESTHESIA POSTPROCEDURE EVALUATION
Patient: Rell Wilson    Procedure Summary     Date:  10/15/18 Room / Location:   [facility] 2 /  [facility]    Anesthesia Start:  1134 Anesthesia Stop:  1241    Procedure:  COLONOSCOPY with polypectomy and tattooing (N/A ) Diagnosis:       Colon cancer screening      (Colon cancer screening [Z12.11])    Surgeon:  Miranda Caputo MD Provider:  Roselia Arthur CRNA    Anesthesia Type:  MAC ASA Status:  2          Anesthesia Type: MAC  Last vitals  BP   119/87 (10/15/18 1320)   Temp   98.2 °F (36.8 °C) (10/15/18 0956)   Pulse   64 (10/15/18 1320)   Resp   16 (10/15/18 1320)     SpO2   98 % (10/15/18 1320)     Post Anesthesia Care and Evaluation    Patient location during evaluation: PHASE II  Patient participation: complete - patient participated  Level of consciousness: awake  Pain management: adequate  Airway patency: patent  Anesthetic complications: No anesthetic complications  PONV Status: none  Cardiovascular status: acceptable  Respiratory status: acceptable  Hydration status: acceptable       Render Risk Assessment In Note?: no Detail Level: Simple Additional Notes: Milia present on right lid above cyst

## 2021-08-05 ENCOUNTER — OFFICE VISIT (OUTPATIENT)
Dept: INTERNAL MEDICINE | Facility: CLINIC | Age: 56
End: 2021-08-05

## 2021-08-05 VITALS
WEIGHT: 233 LBS | TEMPERATURE: 96.8 F | OXYGEN SATURATION: 99 % | BODY MASS INDEX: 28.97 KG/M2 | RESPIRATION RATE: 20 BRPM | HEART RATE: 100 BPM | SYSTOLIC BLOOD PRESSURE: 140 MMHG | DIASTOLIC BLOOD PRESSURE: 80 MMHG | HEIGHT: 75 IN

## 2021-08-05 DIAGNOSIS — J45.20 MILD INTERMITTENT ASTHMA WITHOUT COMPLICATION: Primary | ICD-10-CM

## 2021-08-05 DIAGNOSIS — D12.2 ADENOMATOUS POLYP OF ASCENDING COLON: ICD-10-CM

## 2021-08-05 DIAGNOSIS — Z12.11 COLON CANCER SCREENING: ICD-10-CM

## 2021-08-05 DIAGNOSIS — R05.9 COUGH: ICD-10-CM

## 2021-08-05 PROCEDURE — 99203 OFFICE O/P NEW LOW 30 MIN: CPT | Performed by: NURSE PRACTITIONER

## 2021-08-05 RX ORDER — MONTELUKAST SODIUM 10 MG/1
10 TABLET ORAL NIGHTLY
Qty: 30 TABLET | Refills: 2 | Status: SHIPPED | OUTPATIENT
Start: 2021-08-05

## 2021-08-05 RX ORDER — ALBUTEROL SULFATE 90 UG/1
2 AEROSOL, METERED RESPIRATORY (INHALATION) EVERY 4 HOURS PRN
Qty: 18 G | Refills: 0 | Status: SHIPPED | OUTPATIENT
Start: 2021-08-05

## 2021-08-05 RX ORDER — MOMETASONE FUROATE AND FORMOTEROL FUMARATE DIHYDRATE 200; 5 UG/1; UG/1
2 AEROSOL RESPIRATORY (INHALATION)
Qty: 13 G | Refills: 11 | COMMUNITY
Start: 2021-08-05

## 2021-08-05 NOTE — PROGRESS NOTES
Chief Complaint   Patient presents with   • Nasal Congestion   • Cough   • Shortness of Breath       Subjective     Rell Wilson is a 55 y.o. male being seen for an acute appointment today regarding chest congestion for 2 weeks. Hewas in the urgent care 7- for similar symptoms, given a Z-yumiko, but symptoms never resolved. This started after illness exposure at work 3 weeks ago. He developed sore throat, nasal congestion and cough. After the Zpak, his sinus pressure and drainage cleared, but he is still has chest tightness, wheezing, and cough. He is SOA. He has had the Covid vaccines in March and April.       History of Present Illness     Allergies   Allergen Reactions   • Cefadroxil Nausea And Vomiting   • Cephalexin    • Dust Mite Extract    • Other    • Penicillins    • Pollen Extract    • Trichophyton          Current Outpatient Medications:   •  omeprazole OTC (PriLOSEC OTC) 20 MG EC tablet, Take  by mouth., Disp: , Rfl:     The following portions of the patient's history were reviewed and updated as appropriate: allergies, current medications, past family history, past medical history, past social history, past surgical history and problem list.    Review of Systems   Constitutional: Negative.  Negative for fever.   HENT: Positive for congestion, rhinorrhea, sinus pressure and sinus pain.    Eyes: Negative.    Respiratory: Positive for cough and wheezing.    Cardiovascular: Negative for palpitations and leg swelling.   Gastrointestinal: Negative.    Endocrine: Negative.    Musculoskeletal: Negative.    Skin: Negative.    Allergic/Immunologic: Positive for environmental allergies.   All other systems reviewed and are negative.      Assessment     Physical Exam  Vitals reviewed.   Constitutional:       Appearance: Normal appearance. He is not ill-appearing.   HENT:      Head: Normocephalic.      Right Ear: Tympanic membrane normal.      Left Ear: Tympanic membrane normal.      Nose: Congestion and  rhinorrhea present.   Cardiovascular:      Rate and Rhythm: Normal rate and regular rhythm.      Pulses: Normal pulses.      Heart sounds: Normal heart sounds. No murmur heard.     Pulmonary:      Effort: Pulmonary effort is normal.      Breath sounds: Normal breath sounds.   Musculoskeletal:         General: No swelling.      Cervical back: Neck supple.   Skin:     General: Skin is warm and dry.   Neurological:      General: No focal deficit present.      Mental Status: He is alert and oriented to person, place, and time.   Psychiatric:         Mood and Affect: Mood normal.         Behavior: Behavior normal.         Thought Content: Thought content normal.         Plan       Diagnoses and all orders for this visit:    1. Mild intermittent asthma without complication (Primary)  -     mometasone-formoterol (Dulera) 200-5 MCG/ACT inhaler; Inhale 2 puffs 2 (Two) Times a Day.  Dispense: 13 g; Refill: 11  -     montelukast (Singulair) 10 MG tablet; Take 1 tablet by mouth Every Night.  Dispense: 30 tablet; Refill: 2  -     albuterol sulfate  (90 Base) MCG/ACT inhaler; Inhale 2 puffs Every 4 (Four) Hours As Needed for Wheezing.  Dispense: 18 g; Refill: 0    2. Cough  -     COVID-19,[facility] ROUTINE, NP/OP SWAB IN TRANSPORT MEDIA OR ESWAB 72 HR TAT - Swab, Nasopharynx    3. Colon cancer screening  -     Ambulatory Referral to Gastroenterology    4. Adenomatous polyp of ascending colon  -     Ambulatory Referral to Gastroenterology      Set up a CPE in January

## 2021-08-06 LAB
LABCORP SARS-COV-2, NAA 2 DAY TAT: NORMAL
SARS-COV-2 RNA RESP QL NAA+PROBE: NOT DETECTED

## 2021-10-13 ENCOUNTER — TELEPHONE (OUTPATIENT)
Dept: GASTROENTEROLOGY | Facility: CLINIC | Age: 56
End: 2021-10-13

## 2021-10-14 ENCOUNTER — PREP FOR SURGERY (OUTPATIENT)
Dept: OTHER | Facility: HOSPITAL | Age: 56
End: 2021-10-14

## 2021-10-14 DIAGNOSIS — Z86.010 PERSONAL HISTORY OF COLONIC POLYPS: Primary | ICD-10-CM

## 2022-02-25 ENCOUNTER — OFFICE VISIT (OUTPATIENT)
Dept: INTERNAL MEDICINE | Facility: CLINIC | Age: 57
End: 2022-02-25

## 2022-02-25 VITALS
DIASTOLIC BLOOD PRESSURE: 85 MMHG | BODY MASS INDEX: 30.19 KG/M2 | WEIGHT: 242.8 LBS | SYSTOLIC BLOOD PRESSURE: 115 MMHG | HEART RATE: 70 BPM | HEIGHT: 75 IN | RESPIRATION RATE: 16 BRPM | OXYGEN SATURATION: 97 %

## 2022-02-25 DIAGNOSIS — R41.3 MEMORY DISTURBANCE: Primary | ICD-10-CM

## 2022-02-25 DIAGNOSIS — Z11.59 NEED FOR HEPATITIS C SCREENING TEST: ICD-10-CM

## 2022-02-25 DIAGNOSIS — Z12.5 SCREENING PSA (PROSTATE SPECIFIC ANTIGEN): ICD-10-CM

## 2022-02-25 PROCEDURE — 99213 OFFICE O/P EST LOW 20 MIN: CPT | Performed by: NURSE PRACTITIONER

## 2022-02-25 NOTE — PROGRESS NOTES
"Chief Complaint   Patient presents with   • Memory Loss       Subjective     Rell Wilson is a 56 y.o. male being seen for an acute eappt for memory problems with \"shrt term memory.\" He cannot remember a list of tasks. He lost is mother at age 59 and father at 69. His sister  at 48 years.       pointment today regarding   History of Present Illness     Allergies   Allergen Reactions   • Cefadroxil Nausea And Vomiting   • Cephalexin    • Dust Mite Extract    • Other    • Penicillins    • Pollen Extract    • Trichophyton          Current Outpatient Medications:   •  albuterol sulfate  (90 Base) MCG/ACT inhaler, Inhale 2 puffs Every 4 (Four) Hours As Needed for Wheezing., Disp: 18 g, Rfl: 0  •  mometasone-formoterol (Dulera) 200-5 MCG/ACT inhaler, Inhale 2 puffs 2 (Two) Times a Day., Disp: 13 g, Rfl: 11  •  omeprazole OTC (PriLOSEC OTC) 20 MG EC tablet, Take  by mouth., Disp: , Rfl:   •  montelukast (Singulair) 10 MG tablet, Take 1 tablet by mouth Every Night., Disp: 30 tablet, Rfl: 2    The following portions of the patient's history were reviewed and updated as appropriate: allergies, current medications, past family history, past medical history, past social history, past surgical history and problem list.    Review of Systems   Constitutional: Negative.    HENT: Negative.    Eyes: Negative.    Respiratory: Negative.    Cardiovascular: Negative.  Negative for chest pain, palpitations and leg swelling.   Gastrointestinal: Negative.    Endocrine: Negative.    Musculoskeletal: Negative.    Skin: Negative.    Hematological: Negative.  Negative for adenopathy. Does not bruise/bleed easily.   Psychiatric/Behavioral: Negative.    All other systems reviewed and are negative.      Assessment     Physical Exam  Vitals reviewed.   Constitutional:       Appearance: Normal appearance. He is not ill-appearing.   HENT:      Head: Normocephalic.      Right Ear: Tympanic membrane normal.      Left Ear: Tympanic membrane " normal.   Cardiovascular:      Rate and Rhythm: Normal rate and regular rhythm.      Pulses: Normal pulses.      Heart sounds: Normal heart sounds. No murmur heard.      Pulmonary:      Effort: Pulmonary effort is normal. No respiratory distress.      Breath sounds: Normal breath sounds. No stridor.   Skin:     General: Skin is warm and dry.   Neurological:      General: No focal deficit present.      Mental Status: He is alert and oriented to person, place, and time.   Psychiatric:         Mood and Affect: Mood normal.         Behavior: Behavior normal.         Thought Content: Thought content normal.         Plan     His fasting labs were reviewed with the patient from last week.     Diagnoses and all orders for this visit:    1. Memory disturbance (Primary)  -     Vitamin B12  -     TSH  -     T4, Free  -     CBC (No Diff)  -     Comprehensive Metabolic Panel    2. Screening PSA (prostate specific antigen)  -     PSA SCREENING    3. Need for hepatitis C screening test  -     Hepatitis C Antibody        Follow up after testing

## 2022-02-28 DIAGNOSIS — R41.3 MEMORY DIFFICULTIES: Primary | ICD-10-CM

## 2022-02-28 LAB
ALBUMIN SERPL-MCNC: 4.8 G/DL (ref 3.8–4.9)
ALBUMIN/GLOB SERPL: 2.1 {RATIO} (ref 1.2–2.2)
ALP SERPL-CCNC: 77 IU/L (ref 44–121)
ALT SERPL-CCNC: 17 IU/L (ref 0–44)
AST SERPL-CCNC: 22 IU/L (ref 0–40)
BILIRUB SERPL-MCNC: 0.6 MG/DL (ref 0–1.2)
BUN SERPL-MCNC: 13 MG/DL (ref 6–24)
BUN/CREAT SERPL: 13 (ref 9–20)
C TRACH RRNA SPEC QL NAA+PROBE: NEGATIVE
CALCIUM SERPL-MCNC: 9.4 MG/DL (ref 8.7–10.2)
CHLORIDE SERPL-SCNC: 104 MMOL/L (ref 96–106)
CO2 SERPL-SCNC: 24 MMOL/L (ref 20–29)
CREAT SERPL-MCNC: 0.99 MG/DL (ref 0.76–1.27)
ERYTHROCYTE [DISTWIDTH] IN BLOOD BY AUTOMATED COUNT: 12.2 % (ref 11.6–15.4)
GLOBULIN SER CALC-MCNC: 2.3 G/DL (ref 1.5–4.5)
GLUCOSE SERPL-MCNC: 85 MG/DL (ref 65–99)
HCT VFR BLD AUTO: 43.9 % (ref 37.5–51)
HCV AB S/CO SERPL IA: <0.1 S/CO RATIO (ref 0–0.9)
HGB BLD-MCNC: 14.9 G/DL (ref 13–17.7)
HIV 1+2 AB+HIV1 P24 AG SERPL QL IA: NON REACTIVE
HSV1 IGG SER IA-ACNC: 41.8 INDEX (ref 0–0.9)
HSV2 IGG SER IA-ACNC: 11.4 INDEX (ref 0–0.9)
MCH RBC QN AUTO: 32.7 PG (ref 26.6–33)
MCHC RBC AUTO-ENTMCNC: 33.9 G/DL (ref 31.5–35.7)
MCV RBC AUTO: 96 FL (ref 79–97)
N GONORRHOEA RRNA SPEC QL NAA+PROBE: NEGATIVE
PLATELET # BLD AUTO: 321 X10E3/UL (ref 150–450)
POTASSIUM SERPL-SCNC: 4.9 MMOL/L (ref 3.5–5.2)
PROT SERPL-MCNC: 7.1 G/DL (ref 6–8.5)
PSA SERPL-MCNC: 0.3 NG/ML (ref 0–4)
RBC # BLD AUTO: 4.56 X10E6/UL (ref 4.14–5.8)
RPR SER QL: NON REACTIVE
SODIUM SERPL-SCNC: 146 MMOL/L (ref 134–144)
T4 FREE SERPL-MCNC: 0.98 NG/DL (ref 0.82–1.77)
TSH SERPL DL<=0.005 MIU/L-ACNC: 1.11 UIU/ML (ref 0.45–4.5)
VIT B12 SERPL-MCNC: 433 PG/ML (ref 232–1245)
WBC # BLD AUTO: 7.2 X10E3/UL (ref 3.4–10.8)

## 2022-04-27 ENCOUNTER — HOSPITAL ENCOUNTER (EMERGENCY)
Facility: HOSPITAL | Age: 57
Discharge: HOME | End: 2022-04-27
Attending: EMERGENCY MEDICINE | Admitting: EMERGENCY MEDICINE

## 2022-04-27 ENCOUNTER — APPOINTMENT (OUTPATIENT)
Dept: GENERAL RADIOLOGY | Facility: HOSPITAL | Age: 57
End: 2022-04-27

## 2022-04-27 VITALS
HEART RATE: 62 BPM | HEIGHT: 75 IN | RESPIRATION RATE: 16 BRPM | TEMPERATURE: 97.7 F | DIASTOLIC BLOOD PRESSURE: 82 MMHG | SYSTOLIC BLOOD PRESSURE: 126 MMHG | OXYGEN SATURATION: 99 % | BODY MASS INDEX: 28.95 KG/M2 | WEIGHT: 232.8 LBS

## 2022-04-27 DIAGNOSIS — S61.012A LACERATION OF LEFT THUMB WITHOUT FOREIGN BODY WITHOUT DAMAGE TO NAIL, INITIAL ENCOUNTER: Primary | ICD-10-CM

## 2022-04-27 PROCEDURE — 99282 EMERGENCY DEPT VISIT SF MDM: CPT

## 2022-04-27 PROCEDURE — 12042 INTMD RPR N-HF/GENIT2.6-7.5: CPT | Performed by: EMERGENCY MEDICINE

## 2022-04-27 PROCEDURE — 73140 X-RAY EXAM OF FINGER(S): CPT

## 2022-04-27 RX ORDER — DOXYCYCLINE 100 MG/1
100 CAPSULE ORAL 2 TIMES DAILY
Qty: 10 CAPSULE | Refills: 0 | Status: SHIPPED | OUTPATIENT
Start: 2022-04-27 | End: 2022-05-02

## 2022-04-27 RX ORDER — BUPIVACAINE HYDROCHLORIDE 5 MG/ML
10 INJECTION, SOLUTION EPIDURAL; INTRACAUDAL ONCE
Status: COMPLETED | OUTPATIENT
Start: 2022-04-27 | End: 2022-04-27

## 2022-04-27 RX ADMIN — BUPIVACAINE HYDROCHLORIDE 10 ML: 5 INJECTION, SOLUTION EPIDURAL; INTRACAUDAL; PERINEURAL at 11:35

## 2022-06-21 ENCOUNTER — OFFICE VISIT (OUTPATIENT)
Dept: INTERNAL MEDICINE | Facility: CLINIC | Age: 57
End: 2022-06-21

## 2022-06-21 VITALS
BODY MASS INDEX: 28.82 KG/M2 | HEIGHT: 75 IN | TEMPERATURE: 98.1 F | DIASTOLIC BLOOD PRESSURE: 80 MMHG | SYSTOLIC BLOOD PRESSURE: 118 MMHG | HEART RATE: 76 BPM | WEIGHT: 231.8 LBS | OXYGEN SATURATION: 98 %

## 2022-06-21 DIAGNOSIS — Z00.00 HEALTH CARE MAINTENANCE: Primary | ICD-10-CM

## 2022-06-21 DIAGNOSIS — D12.2 ADENOMATOUS POLYP OF ASCENDING COLON: ICD-10-CM

## 2022-06-21 DIAGNOSIS — Z12.11 SCREEN FOR COLON CANCER: ICD-10-CM

## 2022-06-21 PROBLEM — Z11.59 NEED FOR HEPATITIS C SCREENING TEST: Status: RESOLVED | Noted: 2022-02-25 | Resolved: 2022-06-21

## 2022-06-21 PROCEDURE — 99396 PREV VISIT EST AGE 40-64: CPT | Performed by: NURSE PRACTITIONER

## 2022-06-21 NOTE — PROGRESS NOTES
"Annual Exam      Rell Wilson is being seen for a Complete physical exam. His last physical was unknown.     Social: He is working full time at [employer] as a . He is  to Elif for 25 years. His household includes wife.     Lifestyle: He is not exercising regularly, but walks 18,000 to 20,000 teps at night. . He does use tobacco, cigars once a month. He drinks alcohol 1-2 drinks per week.     Screening: Colonoscopy was completed 1-, due this year.  Last labs reviewed from 2-  included a lipid panel, PSA, CBC, and CMP.       History of Present Illness       The following portions of the patient's history were reviewed and updated as appropriate: allergies, current medications, past family history, past medical history, past social history, past surgical history and problem list.    Review of Systems   Constitutional: Negative.    HENT: Negative.    Eyes: Negative.    Respiratory: Negative.    Cardiovascular: Negative.  Negative for chest pain, palpitations and leg swelling.   Gastrointestinal: Negative.    Endocrine: Negative.    Genitourinary: Negative.    Musculoskeletal: Negative.    Allergic/Immunologic: Negative.    Neurological: Negative.    Hematological: Negative.  Negative for adenopathy. Does not bruise/bleed easily.   Psychiatric/Behavioral: Negative.  Negative for agitation and behavioral problems.   All other systems reviewed and are negative.      Objective          /80   Pulse 76   Temp 98.1 °F (36.7 °C)   Ht 190.5 cm (75\")   Wt 105 kg (231 lb 12.8 oz)   SpO2 98%   BMI 28.97 kg/m²     General Appearance:    Alert, cooperative, no distress, appears stated age   Head:    Normocephalic, without obvious abnormality, atraumatic   Eyes:    PERRL, conjunctiva/corneas clear, EOM's intact, fundi     benign, both eyes        Ears:    Normal TM's and external ear canals, both ears   Nose:   Nares normal, septum midline, mucosa normal, no drainage    or sinus tenderness "   Throat:   Lips, mucosa, and tongue normal; teeth and gums normal   Neck:   Supple, symmetrical, trachea midline, no adenopathy;        thyroid:  No enlargement/tenderness/nodules; no carotid    bruit or JVD   Back:     Symmetric, no curvature, ROM normal, no CVA tenderness   Lungs:     Clear to auscultation bilaterally, respirations unlabored   Chest wall:    No tenderness or deformity   Heart:    Regular rate and rhythm, S1 and S2 normal, no murmur, rub    or gallop   Abdomen:     Soft, non-tender, bowel sounds active all four quadrants,     no masses, no organomegaly   Genitalia:    deferred   Rectal:    deferred   Extremities:   Extremities normal, atraumatic, no cyanosis or edema   Pulses:   2+ and symmetric all extremities   Skin:   Skin color, texture, turgor normal, no rashes or lesions   Lymph nodes:   Cervical, supraclavicular, and axillary nodes normal   Neurologic:   CNII-XII intact. Normal strength, sensation and reflexes      throughout              Assessment & Plan   Diagnoses and all orders for this visit:    1. Health care maintenance (Primary)  -     CBC (No Diff)  -     Comprehensive Metabolic Panel  -     Lipid Panel With / Chol / HDL Ratio    2. Adenomatous polyp of ascending colon  -     Ambulatory Referral to Gastroenterology    3. Screen for colon cancer  -     Ambulatory Referral to Gastroenterology        Preventive counseling: Discussed vaccine. He will get 3rd Covid vaccine, and then pneumonia as least 4 weeks later. Encouraged exercise regimen to prevent cardiac issues, and lipid screening.    Follow up in 1 year

## 2022-06-28 ENCOUNTER — PRE-PROCEDURE SCREENING (OUTPATIENT)
Dept: GASTROENTEROLOGY | Facility: CLINIC | Age: 57
End: 2022-06-28

## 2022-06-30 LAB
ALBUMIN SERPL-MCNC: 4.6 G/DL (ref 3.8–4.9)
ALBUMIN/GLOB SERPL: 2.2 {RATIO} (ref 1.2–2.2)
ALP SERPL-CCNC: 77 IU/L (ref 44–121)
ALT SERPL-CCNC: 18 IU/L (ref 0–44)
AST SERPL-CCNC: 16 IU/L (ref 0–40)
BILIRUB SERPL-MCNC: 0.5 MG/DL (ref 0–1.2)
BUN SERPL-MCNC: 9 MG/DL (ref 6–24)
BUN/CREAT SERPL: 10 (ref 9–20)
CALCIUM SERPL-MCNC: 9.3 MG/DL (ref 8.7–10.2)
CHLORIDE SERPL-SCNC: 105 MMOL/L (ref 96–106)
CHOLEST SERPL-MCNC: 218 MG/DL (ref 100–199)
CHOLEST/HDLC SERPL: 5.2 RATIO (ref 0–5)
CO2 SERPL-SCNC: 22 MMOL/L (ref 20–29)
CREAT SERPL-MCNC: 0.91 MG/DL (ref 0.76–1.27)
EGFRCR SERPLBLD CKD-EPI 2021: 100 ML/MIN/1.73
ERYTHROCYTE [DISTWIDTH] IN BLOOD BY AUTOMATED COUNT: 12.5 % (ref 11.6–15.4)
GLOBULIN SER CALC-MCNC: 2.1 G/DL (ref 1.5–4.5)
GLUCOSE SERPL-MCNC: 103 MG/DL (ref 65–99)
HCT VFR BLD AUTO: 41.8 % (ref 37.5–51)
HDLC SERPL-MCNC: 42 MG/DL
HGB BLD-MCNC: 14.5 G/DL (ref 13–17.7)
LDLC SERPL CALC-MCNC: 130 MG/DL (ref 0–99)
MCH RBC QN AUTO: 33.3 PG (ref 26.6–33)
MCHC RBC AUTO-ENTMCNC: 34.7 G/DL (ref 31.5–35.7)
MCV RBC AUTO: 96 FL (ref 79–97)
PLATELET # BLD AUTO: 326 X10E3/UL (ref 150–450)
POTASSIUM SERPL-SCNC: 4.4 MMOL/L (ref 3.5–5.2)
PROT SERPL-MCNC: 6.7 G/DL (ref 6–8.5)
RBC # BLD AUTO: 4.36 X10E6/UL (ref 4.14–5.8)
SODIUM SERPL-SCNC: 141 MMOL/L (ref 134–144)
TRIGL SERPL-MCNC: 256 MG/DL (ref 0–149)
VLDLC SERPL CALC-MCNC: 46 MG/DL (ref 5–40)
WBC # BLD AUTO: 7 X10E3/UL (ref 3.4–10.8)

## 2022-09-06 ENCOUNTER — PREP FOR SURGERY (OUTPATIENT)
Dept: OTHER | Facility: HOSPITAL | Age: 57
End: 2022-09-06

## 2022-09-06 DIAGNOSIS — Z86.010 PERSONAL HISTORY OF COLONIC POLYPS: Primary | ICD-10-CM

## 2022-09-06 RX ORDER — SODIUM CHLORIDE, SODIUM LACTATE, POTASSIUM CHLORIDE, CALCIUM CHLORIDE 600; 310; 30; 20 MG/100ML; MG/100ML; MG/100ML; MG/100ML
30 INJECTION, SOLUTION INTRAVENOUS CONTINUOUS
Status: CANCELLED | OUTPATIENT
Start: 2022-12-12

## 2022-09-28 PROBLEM — Z86.010 PERSONAL HISTORY OF COLONIC POLYPS: Status: ACTIVE | Noted: 2022-09-28

## 2022-12-12 ENCOUNTER — ANESTHESIA EVENT (OUTPATIENT)
Dept: GASTROENTEROLOGY | Facility: HOSPITAL | Age: 57
End: 2022-12-12

## 2022-12-12 ENCOUNTER — HOSPITAL ENCOUNTER (OUTPATIENT)
Facility: HOSPITAL | Age: 57
Setting detail: HOSPITAL OUTPATIENT SURGERY
Discharge: HOME | End: 2022-12-12
Attending: INTERNAL MEDICINE | Admitting: INTERNAL MEDICINE

## 2022-12-12 ENCOUNTER — ANESTHESIA (OUTPATIENT)
Dept: GASTROENTEROLOGY | Facility: HOSPITAL | Age: 57
End: 2022-12-12

## 2022-12-12 VITALS
HEIGHT: 75 IN | HEART RATE: 74 BPM | DIASTOLIC BLOOD PRESSURE: 80 MMHG | RESPIRATION RATE: 16 BRPM | SYSTOLIC BLOOD PRESSURE: 118 MMHG | BODY MASS INDEX: 29.46 KG/M2 | WEIGHT: 236.9 LBS | OXYGEN SATURATION: 97 %

## 2022-12-12 DIAGNOSIS — Z86.010 PERSONAL HISTORY OF COLONIC POLYPS: ICD-10-CM

## 2022-12-12 PROCEDURE — 0 LIDOCAINE 1 % SOLUTION: Performed by: NURSE ANESTHETIST, CERTIFIED REGISTERED

## 2022-12-12 PROCEDURE — 25010000002 PROPOFOL 10 MG/ML EMULSION: Performed by: NURSE ANESTHETIST, CERTIFIED REGISTERED

## 2022-12-12 PROCEDURE — 88305 TISSUE EXAM BY PATHOLOGIST: CPT | Performed by: INTERNAL MEDICINE

## 2022-12-12 PROCEDURE — 45380 COLONOSCOPY AND BIOPSY: CPT | Performed by: INTERNAL MEDICINE

## 2022-12-12 PROCEDURE — 45385 COLONOSCOPY W/LESION REMOVAL: CPT | Performed by: INTERNAL MEDICINE

## 2022-12-12 RX ORDER — ONDANSETRON 2 MG/ML
4 INJECTION INTRAMUSCULAR; INTRAVENOUS ONCE AS NEEDED
Status: DISCONTINUED | OUTPATIENT
Start: 2022-12-12 | End: 2022-12-12 | Stop reason: HOSPADM

## 2022-12-12 RX ORDER — PROPOFOL 10 MG/ML
VIAL (ML) INTRAVENOUS AS NEEDED
Status: DISCONTINUED | OUTPATIENT
Start: 2022-12-12 | End: 2022-12-12 | Stop reason: SURG

## 2022-12-12 RX ORDER — LIDOCAINE HYDROCHLORIDE 10 MG/ML
INJECTION, SOLUTION INFILTRATION; PERINEURAL AS NEEDED
Status: DISCONTINUED | OUTPATIENT
Start: 2022-12-12 | End: 2022-12-12 | Stop reason: SURG

## 2022-12-12 RX ORDER — SODIUM CHLORIDE, SODIUM LACTATE, POTASSIUM CHLORIDE, CALCIUM CHLORIDE 600; 310; 30; 20 MG/100ML; MG/100ML; MG/100ML; MG/100ML
30 INJECTION, SOLUTION INTRAVENOUS CONTINUOUS
Status: DISCONTINUED | OUTPATIENT
Start: 2022-12-12 | End: 2022-12-12 | Stop reason: HOSPADM

## 2022-12-12 RX ADMIN — PROPOFOL 300 MCG/KG/MIN: 10 INJECTION, EMULSION INTRAVENOUS at 10:24

## 2022-12-12 RX ADMIN — PROPOFOL 40 MG: 10 INJECTION, EMULSION INTRAVENOUS at 10:27

## 2022-12-12 RX ADMIN — SODIUM CHLORIDE, POTASSIUM CHLORIDE, SODIUM LACTATE AND CALCIUM CHLORIDE 30 ML/HR: 600; 310; 30; 20 INJECTION, SOLUTION INTRAVENOUS at 09:52

## 2022-12-12 RX ADMIN — LIDOCAINE HYDROCHLORIDE 30 MG: 10 INJECTION, SOLUTION INFILTRATION; PERINEURAL at 10:24

## 2022-12-12 RX ADMIN — PROPOFOL 120 MG: 10 INJECTION, EMULSION INTRAVENOUS at 10:24

## 2022-12-12 NOTE — H&P
[facility]  Pre Procedure History & Physical    Chief Complaint:   Time for my colonoscopy    Subjective     HPI:   56 y.o. male presenting to endoscopy unit today for surveillance colonoscopy.    Past Medical History:   Past Medical History:   Diagnosis Date   • Asthma    • Colon polyp    • GERD (gastroesophageal reflux disease)    • Plantar fasciitis        Family History:  Family History   Problem Relation Age of Onset   • COPD Mother    • Heart disease Father    • Heart disease Sister    • Diabetes Maternal Grandfather    • Colon cancer Neg Hx    • Colon polyps Neg Hx        Social History:   reports that he has been smoking cigars. He has never used smokeless tobacco. He reports current alcohol use of about 2.0 standard drinks per week. He reports that he does not use drugs.    Medications:   Medications Prior to Admission   Medication Sig Dispense Refill Last Dose   • albuterol sulfate  (90 Base) MCG/ACT inhaler Inhale 2 puffs Every 4 (Four) Hours As Needed for Wheezing. 18 g 0    • mometasone-formoterol (Dulera) 200-5 MCG/ACT inhaler Inhale 2 puffs 2 (Two) Times a Day. 13 g 11    • montelukast (Singulair) 10 MG tablet Take 1 tablet by mouth Every Night. 30 tablet 2    • omeprazole OTC (PriLOSEC OTC) 20 MG EC tablet Take  by mouth.          Allergies:  Cefadroxil, Cephalexin, Dust mite extract, Other, Penicillins, Pollen extract, and Trichophyton    ROS:    Pertinent items are noted in HPI     Objective     There were no vitals taken for this visit.    Physical Exam   Constitutional: Pt is oriented to person, place, and time and well-developed, well-nourished, and in no distress.   Abdominal: Soft.   Psychiatric: Mood, memory, affect and judgment normal.     Assessment & Plan     Diagnosis:  Personal hx of colon polyps    Anticipated Surgical Procedure:  Colonoscopy    The risks, benefits, and alternatives of this procedure have been discussed with the patient or the responsible  party- the patient understands and agrees to proceed.

## 2022-12-12 NOTE — ANESTHESIA POSTPROCEDURE EVALUATION
"Patient: Rell Wilson    Procedure Summary     Date: 12/12/22 Room / Location:  [facility] /  [facility]    Anesthesia Start: 1021 Anesthesia Stop: 1045    Procedure: COLONOSCOPY to cecum  with cold snare polypectomy and cold bx polypectomy Diagnosis:       Personal history of colonic polyps      (Personal history of colonic polyps [Z86.010])    Surgeons: Sean Davis MD Provider: Odin Field MD    Anesthesia Type: MAC ASA Status: 2          Anesthesia Type: MAC    Vitals  Vitals Value Taken Time   /80 12/12/22 1105   Temp     Pulse 74 12/12/22 1105   Resp 16 12/12/22 1105   SpO2 97 % 12/12/22 1105           Post Anesthesia Care and Evaluation    Patient location during evaluation: bedside  Patient participation: complete - patient participated  Level of consciousness: awake and alert  Pain management: adequate    Airway patency: patent  Anesthetic complications: No anesthetic complications    Cardiovascular status: acceptable  Respiratory status: acceptable  Hydration status: acceptable    Comments: /80 (BP Location: Left arm, Patient Position: Lying)   Pulse 74   Resp 16   Ht 190.5 cm (75\")   Wt 107 kg (236 lb 14.4 oz)   SpO2 97%   BMI 29.61 kg/m²       "
Xray Ankle Complete 3 Views, Left

## 2022-12-12 NOTE — ANESTHESIA PREPROCEDURE EVALUATION
Anesthesia Evaluation     Patient summary reviewed and Nursing notes reviewed   no history of anesthetic complications:  NPO Solid Status: > 8 hours  NPO Liquid Status: > 4 hours           Airway   Mallampati: II  Dental      Pulmonary - normal exam   (+) a smoker Current, asthma,  Cardiovascular - negative cardio ROS and normal exam        Neuro/Psych- negative ROS  GI/Hepatic/Renal/Endo    (+)  GERD,      Musculoskeletal     Abdominal    Substance History      OB/GYN          Other                        Anesthesia Plan    ASA 2     MAC     intravenous induction     Anesthetic plan, risks, benefits, and alternatives have been provided, discussed and informed consent has been obtained with: patient.        CODE STATUS:

## 2022-12-13 LAB
LAB AP CASE REPORT: NORMAL
PATH REPORT.FINAL DX SPEC: NORMAL
PATH REPORT.GROSS SPEC: NORMAL

## 2023-01-16 ENCOUNTER — TELEPHONE (OUTPATIENT)
Dept: GASTROENTEROLOGY | Facility: CLINIC | Age: 58
End: 2023-01-16
Payer: COMMERCIAL

## 2023-01-16 NOTE — TELEPHONE ENCOUNTER
----- Message from Sean Davis MD sent at 1/3/2023  2:37 PM EST -----  The polyp(s) biopsies showed adenomatous change. This is not cancerous but is considered potentially precancerous. Follow-up colonoscopy in 5 years is advised.

## 2023-06-07 ENCOUNTER — TELEPHONE (OUTPATIENT)
Dept: INTERNAL MEDICINE | Facility: CLINIC | Age: 58
End: 2023-06-07
Payer: COMMERCIAL

## 2023-06-11 DIAGNOSIS — Z13.220 LIPID SCREENING: Primary | ICD-10-CM

## 2023-06-11 DIAGNOSIS — Z12.5 SCREENING FOR PROSTATE CANCER: ICD-10-CM

## 2023-06-11 DIAGNOSIS — Z13.0 SCREENING, ANEMIA, DEFICIENCY, IRON: ICD-10-CM

## 2023-06-11 DIAGNOSIS — Z13.1 SCREENING FOR DIABETES MELLITUS: ICD-10-CM

## (undated) DEVICE — JACKT LAB KNIT COLR LG BLU

## (undated) DEVICE — SUCTION CANISTER, 3000CC,SAFELINER: Brand: DEROYAL

## (undated) DEVICE — TRAP POLYP 4CHAMBER

## (undated) DEVICE — ADAPT CLN BIOGUARD AIR/H2O DISP

## (undated) DEVICE — GOWN ISOL W/THUMB UNIV BLU BX/15

## (undated) DEVICE — PAD GRND E/S W/CORD SPLT A/

## (undated) DEVICE — GLV SURG SENSICARE MICRO PF LF 6 STRL

## (undated) DEVICE — ENDO. PORT CONNECTOR W/VALVE FOR OLYMPUS® SCOPES: Brand: ERBE

## (undated) DEVICE — Device: Brand: SPOT EX ENDOSCOPIC TATTOO

## (undated) DEVICE — CONMED FLEXITIP DISPOSABLE SCLEROTHERAPY NEEDLE, OPTIC YELLOW COLONOSCOPE NEEDLE, 5 MM X 230 CM, 25 GM: Brand: FLEXITIP

## (undated) DEVICE — MASK,FACE,FLUID RESIST,SHLD,EARLOOP: Brand: MEDLINE

## (undated) DEVICE — Device: Brand: DEFENDO AIR/WATER/SUCTION AND BIOPSY VALVE

## (undated) DEVICE — VIAL FORMALIN CAP 10P 40ML

## (undated) DEVICE — SYR LL 3CC

## (undated) DEVICE — SPNG GZ WOVN 4X4IN 12PLY 10/BX STRL

## (undated) DEVICE — LN SMPL CO2 SHTRM SD STREAM W/M LUER

## (undated) DEVICE — HYBRID TUBING/CAP SET FOR OLYMPUS® SCOPES: Brand: ERBE

## (undated) DEVICE — SENSR O2 OXIMAX FNGR A/ 18IN NONSTR

## (undated) DEVICE — THE SINGLE USE ETRAP – POLYP TRAP IS USED FOR SUCTION RETRIEVAL OF ENDOSCOPICALLY REMOVED POLYPS.: Brand: ETRAP

## (undated) DEVICE — SNAR POLYP CAPTIFLX WD OVL 27MM 240CM

## (undated) DEVICE — Device

## (undated) DEVICE — KT ORCA ORCAPOD DISP STRL

## (undated) DEVICE — BW-412T DISP COMBO CLEANING BRUSH: Brand: SINGLE USE COMBINATION CLEANING BRUSH

## (undated) DEVICE — FRCP BX RADJAW4 NDL 2.8 240CM LG OG BX40

## (undated) DEVICE — CANN O2 ETCO2 FITS ALL CONN CO2 SMPL A/ 7IN DISP LF

## (undated) DEVICE — TUBING, SUCTION, 1/4" X 10', STRAIGHT: Brand: MEDLINE

## (undated) DEVICE — SNAR POLYP CAPTIVATOR RND STFF 2.4 240CM 10MM 1P/U